# Patient Record
Sex: MALE | NOT HISPANIC OR LATINO | URBAN - METROPOLITAN AREA
[De-identification: names, ages, dates, MRNs, and addresses within clinical notes are randomized per-mention and may not be internally consistent; named-entity substitution may affect disease eponyms.]

---
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
## 2022-10-05 NOTE — PROCEDURE: TREATMENT REGIMEN
Please see below  Please assist with scheduling pt (I believe there are openings on Friday if patient is available)  Please also double check chart as it was newly created  Thank you!
Detail Level: Zone
Discontinue Regimen: All prescription acne medications, just gentle cleanser and moisturizer